# Patient Record
Sex: MALE | URBAN - METROPOLITAN AREA
[De-identification: names, ages, dates, MRNs, and addresses within clinical notes are randomized per-mention and may not be internally consistent; named-entity substitution may affect disease eponyms.]

---

## 2023-12-28 ENCOUNTER — APPOINTMENT (OUTPATIENT)
Dept: URBAN - METROPOLITAN AREA CLINIC 210 | Age: 23
Setting detail: DERMATOLOGY
End: 2023-12-31

## 2023-12-28 DIAGNOSIS — L64.8 OTHER ANDROGENIC ALOPECIA: ICD-10-CM

## 2023-12-28 PROCEDURE — OTHER MIPS QUALITY: OTHER

## 2023-12-28 PROCEDURE — OTHER COUNSELING: OTHER

## 2023-12-28 PROCEDURE — OTHER PRESCRIPTION: OTHER

## 2023-12-28 PROCEDURE — 99203 OFFICE O/P NEW LOW 30 MIN: CPT

## 2023-12-28 PROCEDURE — OTHER PRESCRIPTION MEDICATION MANAGEMENT: OTHER

## 2023-12-28 PROCEDURE — OTHER OTC TREATMENT REGIMEN: OTHER

## 2023-12-28 PROCEDURE — OTHER ADDITIONAL NOTES: OTHER

## 2023-12-28 RX ORDER — KETOCONAZOLE 20 MG/ML
SHAMPOO, SUSPENSION TOPICAL
Qty: 120 | Refills: 6 | Status: ERX | COMMUNITY
Start: 2023-12-28

## 2023-12-28 RX ORDER — FINASTERIDE 5 MG/1
TABLET, FILM COATED ORAL
Qty: 45 | Refills: 3 | Status: ERX | COMMUNITY
Start: 2023-12-28

## 2023-12-28 ASSESSMENT — LOCATION SIMPLE DESCRIPTION DERM
LOCATION SIMPLE: SCALP
LOCATION SIMPLE: RIGHT SCALP
LOCATION SIMPLE: LEFT SCALP

## 2023-12-28 ASSESSMENT — LOCATION DETAILED DESCRIPTION DERM
LOCATION DETAILED: RIGHT SUPERIOR PARIETAL SCALP
LOCATION DETAILED: LEFT CENTRAL FRONTAL SCALP
LOCATION DETAILED: RIGHT CENTRAL FRONTAL SCALP

## 2023-12-28 ASSESSMENT — LOCATION ZONE DERM: LOCATION ZONE: SCALP

## 2023-12-28 NOTE — PROCEDURE: ADDITIONAL NOTES
Additional Notes: At next office visit, if there is not significant improvement consider dutasteride and referral for hair transplant.
Detail Level: Simple
Render Risk Assessment In Note?: no

## 2023-12-28 NOTE — PROCEDURE: OTC TREATMENT REGIMEN
Detail Level: Zone
Patient Specific Otc Recommendations (Will Not Stick From Patient To Patient): Nutrafol- take 2 tablets daily.

## 2023-12-28 NOTE — PROCEDURE: PRESCRIPTION MEDICATION MANAGEMENT
Plan: Exam reveals classic androgenic alopecia. Patient reports family history of male paternal baldness. Patient denies family history of prostate cancer.
Render In Strict Bullet Format?: No
Detail Level: Zone
Initiate Treatment: Finasteride 5mg- 1/2 tablet by mouth once at night. Ketoconazole shampoo- shampoo with medication twice weekly.

## 2024-04-30 ENCOUNTER — APPOINTMENT (OUTPATIENT)
Dept: URBAN - METROPOLITAN AREA CLINIC 210 | Age: 24
Setting detail: DERMATOLOGY
End: 2024-04-30

## 2024-04-30 DIAGNOSIS — L64.8 OTHER ANDROGENIC ALOPECIA: ICD-10-CM

## 2024-04-30 PROCEDURE — OTHER COUNSELING: OTHER

## 2024-04-30 PROCEDURE — OTHER OTC TREATMENT REGIMEN: OTHER

## 2024-04-30 PROCEDURE — OTHER PRESCRIPTION: OTHER

## 2024-04-30 PROCEDURE — OTHER MIPS QUALITY: OTHER

## 2024-04-30 PROCEDURE — OTHER PRESCRIPTION MEDICATION MANAGEMENT: OTHER

## 2024-04-30 PROCEDURE — 99213 OFFICE O/P EST LOW 20 MIN: CPT

## 2024-04-30 RX ORDER — MINOXIDIL 2.5 MG/1
TABLET ORAL
Qty: 45 | Refills: 3 | Status: ERX | COMMUNITY
Start: 2024-04-30

## 2024-04-30 ASSESSMENT — LOCATION ZONE DERM: LOCATION ZONE: SCALP

## 2024-04-30 ASSESSMENT — LOCATION SIMPLE DESCRIPTION DERM
LOCATION SIMPLE: SCALP
LOCATION SIMPLE: RIGHT SCALP
LOCATION SIMPLE: LEFT SCALP

## 2024-04-30 ASSESSMENT — LOCATION DETAILED DESCRIPTION DERM
LOCATION DETAILED: LEFT CENTRAL FRONTAL SCALP
LOCATION DETAILED: RIGHT SUPERIOR PARIETAL SCALP
LOCATION DETAILED: RIGHT CENTRAL FRONTAL SCALP

## 2024-04-30 NOTE — PROCEDURE: PRESCRIPTION MEDICATION MANAGEMENT
Continue Regimen: Finasteride 5mg- 1/2 tablet by mouth once at night. Ketoconazole shampoo- shampoo with medication twice weekly.
Plan: Exam reveals classic androgenic alopecia. Patient reports family history of male paternal baldness. Patient denies family history of prostate cancer.
Render In Strict Bullet Format?: No
Detail Level: Zone
Initiate Treatment: Minoxidil 2.5mg take 1/2 pill once daily. Discussed low side effects of leg swelling and shortness of breath, to stop medication and inform the office.